# Patient Record
Sex: FEMALE | Race: ASIAN | NOT HISPANIC OR LATINO | URBAN - METROPOLITAN AREA
[De-identification: names, ages, dates, MRNs, and addresses within clinical notes are randomized per-mention and may not be internally consistent; named-entity substitution may affect disease eponyms.]

---

## 2017-01-10 ENCOUNTER — EMERGENCY (EMERGENCY)
Facility: HOSPITAL | Age: 22
LOS: 1 days | Discharge: HOME | End: 2017-01-10
Attending: EMERGENCY MEDICINE | Admitting: EMERGENCY MEDICINE
Payer: COMMERCIAL

## 2017-01-10 VITALS
DIASTOLIC BLOOD PRESSURE: 73 MMHG | RESPIRATION RATE: 16 BRPM | OXYGEN SATURATION: 100 % | TEMPERATURE: 98 F | SYSTOLIC BLOOD PRESSURE: 119 MMHG | HEART RATE: 56 BPM

## 2017-01-10 PROCEDURE — 99283 EMERGENCY DEPT VISIT LOW MDM: CPT

## 2017-01-10 RX ORDER — IBUPROFEN 200 MG
400 TABLET ORAL ONCE
Qty: 0 | Refills: 0 | Status: COMPLETED | OUTPATIENT
Start: 2017-01-10 | End: 2017-01-10

## 2017-01-10 RX ADMIN — Medication 400 MILLIGRAM(S): at 15:40

## 2017-01-10 NOTE — ED PROVIDER NOTE - CHPI ED SYMPTOMS NEG
no fever, no chills, no numbness/tingling of extremities, no weakness, no cough, no CP, no SOB, no nausea, no vomiting, no abrasion, no laceration/no loss of consciousness

## 2017-01-10 NOTE — ED PROVIDER NOTE - PHYSICAL EXAMINATION
ATTENDING PHYSICAL EXAM DR. MANDEL ***GEN - NAD; well appearing; A+O x3 ***HEAD - NC/AT ***EYES/NOSE - PERRL, EOMI, mucous membranes moist, no discharge ***  ***PULMONARY - CTA b/l, symmetric breath sounds. ***CARDIAC -s1s2, RRR, no M,G,R  ***ABDOMEN - +BS, ND, NT, soft, no guarding, no rebound, no masses   ***BACK - no CVA tenderness, Normal  spine ***EXTREMITIES - symmetric pulses, 2+ dp, capillary refill < 2 seconds, no clubbing, no cyanosis, no edema, FROM left shoulder, no bony tenderness, no deformity ***SKIN - no rash or bruising   ***NEUROLOGIC - alert, CN 2-12 intact, reflexes nl, sensation nl, coordination nl, finger to nose nl, romberg negative, motor 5/5 RUE/LUE/RLE/LLE/EHL/Plantar flexion, no pronator drift, gait nl

## 2017-01-10 NOTE — ED PROVIDER NOTE - NS ED MD SCRIBE ATTENDING SCRIBE SECTIONS
DISPOSITION/VITAL SIGNS( Pullset)/PAST MEDICAL/SURGICAL/SOCIAL HISTORY/HIV/HISTORY OF PRESENT ILLNESS/PHYSICAL EXAM/REVIEW OF SYSTEMS
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
